# Patient Record
(demographics unavailable — no encounter records)

---

## 2025-06-11 NOTE — DATA REVIEWED
[FreeTextEntry1] : 7/31/23 sofia Dolan dMMG and US: TC 6.9%. FG. Amorphic regional calcifications posterior/N5 RA RIGHT breast are not significantly changed from 10/17/2016, 10/14/2015. Central/3:00 LEFT biopsy tissue marker. No suspicious mass, or other sign of malignancy is identified. US: R- no susp solid mass. L- No susp solid mass. 6 x 3 mm hypoechoic nodule in the 12:00 N2 LEFT breast most likely corresponds to a stable nodule previously annotated in the 11:00 N2 LEFT breast. Rec mmg in 1yr. BR2.  8/12/2024 Kelsi bilat sMMG and complete US: TC 7.9% The breasts are HG dense. No suspicious mass, suspicious microcalcifications, or other sign of malignancy is identified.  Stable right breast calcifications noted. [place]-Grade 0. US: R- No suspicious solid mass. L- No suspicious solid mass. IMPRESSION: No mammographic or sonographic evidence of malignancy. RECOMMENDATION:  Mammography in 1 year. BR2

## 2025-06-11 NOTE — PHYSICAL EXAM
[Normocephalic] : normocephalic [Atraumatic] : atraumatic [Supple] : supple [No Supraclavicular Adenopathy] : no supraclavicular adenopathy [No Thyromegaly] : no thyromegaly [Examined in the supine and seated position] : examined in the supine and seated position [Symmetrical] : symmetrical [Bra Size: ___] : Bra Size: [unfilled] [Grade 2] : Ptosis Grade 2 [No dominant masses] : no dominant masses in right breast  [No dominant masses] : no dominant masses left breast [No Nipple Retraction] : no left nipple retraction [No Nipple Discharge] : no left nipple discharge [No Axillary Lymphadenopathy] : no left axillary lymphadenopathy [No Edema] : no edema [No Swelling] : no swelling [Full ROM] : full range of motion [No Rashes] : no rashes [No Ulceration] : no ulceration [de-identified] : Bilat FCC, b/l PA excisional bx scars. No suspicious findings, NOHEMI.  [TextEntry] : Psych: Appropriate, NAD, A&Ox3 Neuro: Intact grossly, gait normal

## 2025-06-11 NOTE — HISTORY OF PRESENT ILLNESS
[FreeTextEntry1] : PCP Martha Malone DO  Patient is a 63 year old female here today for follow up. She was initially seen in 6/2023 for consultation of bilateral breast pain. Previously reported her breasts felt different s/p bilateral excisional biopsies.  Patient states b/l biopsies always for calcifications and final path was benign. Pt denies any breast lesions, discharge or masses.   No breast complaints at this time, pt reports she was told she needed a f.u. for her mmg orders with us.  She previously reported her exc bx were all benign, she has had multiple excisional biopsies, although she reports 1 biopsy (20+ years ago) showed "atypical cells." She has all her path reports and will bring to our office for review. She thinks it was ADH. She also reports she was seen by Med/Onc at the time and was rec to take an anti-hormone medication at the time, but she declined.  She spends most of the year in [place] but summ[place] in [facility] [place]. Prior imaging was through [facility], [place].  7/31/23 sofia Dolan dMMG and US: TC 6.9%. FG. Amorphic regional calcifications posterior/N5 RA RIGHT breast are not significantly changed from 10/17/2016, 10/14/2015. Central/3:00 LEFT biopsy tissue marker. No suspicious mass, or other sign of malignancy is identified. US: R- no susp solid mass. L- No susp solid mass. 6 x 3 mm hypoechoic nodule in the 12:00 N2 LEFT breast most likely corresponds to a stable nodule previously annotated in the 11:00 N2 LEFT breast. Rec mmg in 1yr. BR2.  8/12/2024 Kelsi black sMMG and complete US: TC 7.9% The breasts are HG dense. No suspicious mass, suspicious microcalcifications, or other sign of malignancy is identified.  Stable right breast calcifications noted. [place]-Grade 0. US: R- No suspicious solid mass. L- No suspicious solid mass. IMPRESSION: No mammographic or sonographic evidence of malignancy. RECOMMENDATION:  Mammography in 1 year. BR2  Fhx: Leukemia- father age 50. Pt is [place] descent.

## 2025-06-11 NOTE — PAST MEDICAL HISTORY
[Postmenopausal] : The patient is postmenopausal [Menarche Age ____] : age at menarche was [unfilled] [Menopause Age____] : age at menopause was [unfilled] [Definite ___ (Date)] : the last menstrual period was [unfilled] [Total Preg ___] : G[unfilled] [Live Births ___] : P[unfilled]  [Age At Live Birth ___] : Age at live birth: [unfilled] [FreeTextEntry7] : History of OCP use.

## 2025-06-11 NOTE — ASSESSMENT
[FreeTextEntry1] : PCP Martha Malone DO  Patient is a 63 year old female here today for follow up. She was initially seen in 6/2023 for consultation of bilateral breast pain. Previously reported her breasts felt different s/p bilateral excisional biopsies.  Patient states b/l biopsies always for calcifications and final path was benign. Pt denies any breast lesions, discharge or masses.   No breast complaints at this time, pt reports she was told she needed a f.u. for her mmg orders with us.  She previously reported her exc bx were all benign, she has had multiple excisional biopsies, although she reports 1 biopsy (20+ years ago) showed "atypical cells." She has all her path reports and will bring to our office for review. She thinks it was ADH. She also reports she was seen by Med/Onc at the time and was rec to take an anti-hormone medication at the time, but she declined.  She spends most of the year in [place] but [place] [facility] [place] imaging was through [place], [place].  7/31/23 sofia Dolan dMMG and US: TC 6.9%. FG. Amorphic regional calcifications posterior/N5 RA RIGHT breast are not significantly changed from 10/17/2016, 10/14/2015. Central/3:00 LEFT biopsy tissue marker. No suspicious mass, or other sign of malignancy is identified. US: R- no susp solid mass. L- No susp solid mass. 6 x 3 mm hypoechoic nodule in the 12:00 N2 LEFT breast most likely corresponds to a stable nodule previously annotated in the 11:00 N2 LEFT breast. Rec mmg in 1yr. BR2.  8/12/2024 Kelsi black sMMG and complete US: TC 7.9% The breasts are HG dense. No suspicious mass, suspicious microcalcifications, or other sign of malignancy is identified.  Stable right breast calcifications noted. [place]-Grade 0. US: R- No suspicious solid mass. L- No suspicious solid mass. IMPRESSION: No mammographic or sonographic evidence of malignancy. RECOMMENDATION:  Mammography in 1 year. BR2  Fhx: Leukemia- father age 50. Pt is [place]ken[place] descent.   CBE: 36C. Bilat FCC, b/l PA excisional bx scars and right inferior curvilinear scar, No suspicious findings, NOHEMI. No axillary or SC adenopathy. Full ROM.   Reviewed CBE today, NOHEMI. Bilat mmg/US due 8/25, MRI ? if pt had hx of atypia, TC score increases to 39.4% and will be eligible for annual sMRI. Pt will drop off a copy of her prior path reports for us to review. If there was atypia, pt would qualify for HRP which would mean rec MMG/U/s q year alternating with MRI q year followed by CBE alternating with pcp/gyn and us after each study so she is examined q 6 mos after each study.   PLAN: Bilat mmg and u/s in August Pt to drop off the prior pathology, if atypia, enroll in HRP. MRI due 2/26, pt to get in [place].  F/u in 1 year with us. IF HRP, rec CBE q 6 mos.

## 2025-06-11 NOTE — HISTORY OF PRESENT ILLNESS
[FreeTextEntry1] : PCP Martha Malone DO  Patient is a 63 year old female here today for follow up. She was initially seen in 6/2023 for consultation of bilateral breast pain. Previously reported her breasts felt different s/p bilateral excisional biopsies.  Patient states b/l biopsies always for calcifications and final path was benign. Pt denies any breast lesions, discharge or masses.   No breast complaints at this time, pt reports she was told she needed a f.u. for her mmg orders with us.  She previously reported her exc bx were all benign, she has had multiple excisional biopsies, although she reports 1 biopsy (20+ years ago) showed "atypical cells." She has all her path reports and will bring to our office for review. She thinks it was ADH. She also reports she was seen by Med/Onc at the time and was rec to take an anti-hormone medication at the time, but she declined.  She spends most of the year in [place] but summ[place] in [facility]. Prior imaging was through [facility], [place].  7/31/23 sofia Dolan dMMG and US: TC 6.9%. FG. Amorphic regional calcifications posterior/N5 RA RIGHT breast are not significantly changed from 10/17/2016, 10/14/2015. Central/3:00 LEFT biopsy tissue marker. No suspicious mass, or other sign of malignancy is identified. US: R- no susp solid mass. L- No susp solid mass. 6 x 3 mm hypoechoic nodule in the 12:00 N2 LEFT breast most likely corresponds to a stable nodule previously annotated in the 11:00 N2 LEFT breast. Rec mmg in 1yr. BR2.  8/12/2024 Kelsi black sMMG and complete US: TC 7.9% The breasts are HG dense. No suspicious mass, suspicious microcalcifications, or other sign of malignancy is identified.  Stable right breast calcifications noted. [place]-Grade 0. US: R- No suspicious solid mass. L- No suspicious solid mass. IMPRESSION: No mammographic or sonographic evidence of malignancy. RECOMMENDATION:  Mammography in 1 year. BR2  Fhx: Leukemia- father age 50. Pt is [place] descent.

## 2025-06-11 NOTE — CONSULT LETTER
[Dear  ___] : Dear  [unfilled], [Courtesy Letter:] : I had the pleasure of seeing your patient, [unfilled], in my office today. [Please see my note below.] : Please see my note below. [Sincerely,] : Sincerely, [FreeTextEntry3] : Leslie Barth MD

## 2025-06-11 NOTE — ASSESSMENT
[FreeTextEntry1] : PCP Martha Malone DO  Patient is a 63 year old female here today for follow up. She was initially seen in 6/2023 for consultation of bilateral breast pain. Previously reported her breasts felt different s/p bilateral excisional biopsies.  Patient states b/l biopsies always for calcifications and final path was benign. Pt denies any breast lesions, discharge or masses.   No breast complaints at this time, pt reports she was told she needed a f.u. for her mmg orders with us.  She previously reported her exc bx were all benign, she has had multiple excisional biopsies, although she reports 1 biopsy (20+ years ago) showed "atypical cells." She has all her path reports and will bring to our office for review. She thinks it was ADH. She also reports she was seen by Med/Onc at the time and was rec to take an anti-hormone medication at the time, but she declined.  She spends most of the year in [place] but [place] [facility] [place] imaging was through [place], [place].  7/31/23 sofia Dolan dMMG and US: TC 6.9%. FG. Amorphic regional calcifications posterior/N5 RA RIGHT breast are not significantly changed from 10/17/2016, 10/14/2015. Central/3:00 LEFT biopsy tissue marker. No suspicious mass, or other sign of malignancy is identified. US: R- no susp solid mass. L- No susp solid mass. 6 x 3 mm hypoechoic nodule in the 12:00 N2 LEFT breast most likely corresponds to a stable nodule previously annotated in the 11:00 N2 LEFT breast. Rec mmg in 1yr. BR2.  8/12/2024 Kelsi black sMMG and complete US: TC 7.9% The breasts are HG dense. No suspicious mass, suspicious microcalcifications, or other sign of malignancy is identified.  Stable right breast calcifications noted. [place]-Grade 0. US: R- No suspicious solid mass. L- No suspicious solid mass. IMPRESSION: No mammographic or sonographic evidence of malignancy. RECOMMENDATION:  Mammography in 1 year. BR2  Fhx: Leukemia- father age 50. Pt is Ashkenazi [place] descent.   CBE: 36C. Bilat FCC, b/l PA excisional bx scars and right inferior curvilinear scar, No suspicious findings, NOHEMI. No axillary or SC adenopathy. Full ROM.   Reviewed CBE today, NOHEMI. Bilat mmg/US due 8/25, MRI ? if pt had hx of atypia, TC score increases to 39.4% and will be eligible for annual sMRI. Pt will drop off a copy of her prior path reports for us to review. If there was atypia, pt would qualify for HRP which would mean rec MMG/U/s q year alternating with MRI q year followed by CBE alternating with pcp/gyn and us after each study so she is examined q 6 mos after each study.   PLAN: Bilat mmg and u/s in August Pt to drop off the prior pathology, if atypia, enroll in HRP. MRI due 2/26, pt to get in [place].  F/u in 1 year with us. IF HRP, rec CBE q 6 mos.

## 2025-06-11 NOTE — PHYSICAL EXAM
[Normocephalic] : normocephalic [Atraumatic] : atraumatic [Supple] : supple [No Supraclavicular Adenopathy] : no supraclavicular adenopathy [No Thyromegaly] : no thyromegaly [Examined in the supine and seated position] : examined in the supine and seated position [Symmetrical] : symmetrical [Bra Size: ___] : Bra Size: [unfilled] [Grade 2] : Ptosis Grade 2 [No dominant masses] : no dominant masses in right breast  [No dominant masses] : no dominant masses left breast [No Nipple Retraction] : no left nipple retraction [No Nipple Discharge] : no left nipple discharge [No Axillary Lymphadenopathy] : no left axillary lymphadenopathy [No Edema] : no edema [No Swelling] : no swelling [Full ROM] : full range of motion [No Rashes] : no rashes [No Ulceration] : no ulceration [de-identified] : Bilat FCC, b/l PA excisional bx scars. No suspicious findings, NOHEMI.  [TextEntry] : Psych: Appropriate, NAD, A&Ox3 Neuro: Intact grossly, gait normal